# Patient Record
Sex: FEMALE | Race: WHITE | NOT HISPANIC OR LATINO | Employment: FULL TIME | URBAN - METROPOLITAN AREA
[De-identification: names, ages, dates, MRNs, and addresses within clinical notes are randomized per-mention and may not be internally consistent; named-entity substitution may affect disease eponyms.]

---

## 2017-06-08 ENCOUNTER — RECORDS - HEALTHEAST (OUTPATIENT)
Dept: LAB | Facility: CLINIC | Age: 57
End: 2017-06-08

## 2017-06-08 LAB
CHOLEST SERPL-MCNC: 164 MG/DL
FASTING STATUS PATIENT QL REPORTED: ABNORMAL
HDLC SERPL-MCNC: 49 MG/DL
LDLC SERPL CALC-MCNC: 103 MG/DL
TRIGL SERPL-MCNC: 58 MG/DL

## 2018-02-07 ENCOUNTER — RECORDS - HEALTHEAST (OUTPATIENT)
Dept: ADMINISTRATIVE | Facility: OTHER | Age: 58
End: 2018-02-07

## 2019-07-23 ENCOUNTER — RECORDS - HEALTHEAST (OUTPATIENT)
Dept: ADMINISTRATIVE | Facility: OTHER | Age: 59
End: 2019-07-23

## 2020-01-28 ENCOUNTER — RECORDS - HEALTHEAST (OUTPATIENT)
Dept: ADMINISTRATIVE | Facility: OTHER | Age: 60
End: 2020-01-28

## 2020-01-28 ENCOUNTER — COMMUNICATION - HEALTHEAST (OUTPATIENT)
Dept: PULMONOLOGY | Facility: OTHER | Age: 60
End: 2020-01-28

## 2020-02-26 ENCOUNTER — AMBULATORY - HEALTHEAST (OUTPATIENT)
Dept: PULMONOLOGY | Facility: OTHER | Age: 60
End: 2020-02-26

## 2020-02-26 DIAGNOSIS — J44.9 COPD (CHRONIC OBSTRUCTIVE PULMONARY DISEASE) (H): ICD-10-CM

## 2020-03-06 ENCOUNTER — RECORDS - HEALTHEAST (OUTPATIENT)
Dept: PULMONOLOGY | Facility: OTHER | Age: 60
End: 2020-03-06

## 2020-03-06 ENCOUNTER — RECORDS - HEALTHEAST (OUTPATIENT)
Dept: ADMINISTRATIVE | Facility: OTHER | Age: 60
End: 2020-03-06

## 2020-03-06 DIAGNOSIS — J44.9 CHRONIC OBSTRUCTIVE PULMONARY DISEASE, UNSPECIFIED (H): ICD-10-CM

## 2020-03-06 LAB — HGB BLD-MCNC: 14.6 G/DL

## 2020-03-24 ENCOUNTER — AMBULATORY - HEALTHEAST (OUTPATIENT)
Dept: PULMONOLOGY | Facility: OTHER | Age: 60
End: 2020-03-24

## 2020-03-24 RX ORDER — LORATADINE 10 MG/1
10 TABLET ORAL DAILY PRN
Status: SHIPPED | COMMUNITY
Start: 2020-03-24

## 2020-04-01 ENCOUNTER — OFFICE VISIT - HEALTHEAST (OUTPATIENT)
Dept: PULMONOLOGY | Facility: OTHER | Age: 60
End: 2020-04-01

## 2020-04-01 DIAGNOSIS — J41.0 SIMPLE CHRONIC BRONCHITIS (H): ICD-10-CM

## 2020-04-01 ASSESSMENT — MIFFLIN-ST. JEOR: SCORE: 1410.04

## 2020-05-06 ENCOUNTER — COMMUNICATION - HEALTHEAST (OUTPATIENT)
Dept: PULMONOLOGY | Facility: OTHER | Age: 60
End: 2020-05-06

## 2020-08-27 ENCOUNTER — COMMUNICATION - HEALTHEAST (OUTPATIENT)
Dept: FAMILY MEDICINE | Facility: CLINIC | Age: 60
End: 2020-08-27

## 2020-08-27 ENCOUNTER — COMMUNICATION - HEALTHEAST (OUTPATIENT)
Dept: SCHEDULING | Facility: CLINIC | Age: 60
End: 2020-08-27

## 2020-08-28 ENCOUNTER — COMMUNICATION - HEALTHEAST (OUTPATIENT)
Dept: RESPIRATORY THERAPY | Facility: HOSPITAL | Age: 60
End: 2020-08-28

## 2020-08-31 ENCOUNTER — COMMUNICATION - HEALTHEAST (OUTPATIENT)
Dept: RESPIRATORY THERAPY | Facility: CLINIC | Age: 60
End: 2020-08-31

## 2020-09-03 ENCOUNTER — COMMUNICATION - HEALTHEAST (OUTPATIENT)
Dept: RESPIRATORY THERAPY | Facility: CLINIC | Age: 60
End: 2020-09-03

## 2020-09-08 ENCOUNTER — COMMUNICATION - HEALTHEAST (OUTPATIENT)
Dept: RESPIRATORY THERAPY | Facility: CLINIC | Age: 60
End: 2020-09-08

## 2020-09-29 ENCOUNTER — COMMUNICATION - HEALTHEAST (OUTPATIENT)
Dept: RESPIRATORY THERAPY | Facility: HOSPITAL | Age: 60
End: 2020-09-29

## 2020-10-27 ENCOUNTER — COMMUNICATION - HEALTHEAST (OUTPATIENT)
Dept: RESPIRATORY THERAPY | Facility: CLINIC | Age: 60
End: 2020-10-27

## 2020-11-30 ENCOUNTER — COMMUNICATION - HEALTHEAST (OUTPATIENT)
Dept: RESPIRATORY THERAPY | Facility: CLINIC | Age: 60
End: 2020-11-30

## 2021-01-14 ENCOUNTER — COMMUNICATION - HEALTHEAST (OUTPATIENT)
Dept: RESPIRATORY THERAPY | Facility: HOSPITAL | Age: 61
End: 2021-01-14

## 2021-01-27 ENCOUNTER — COMMUNICATION - HEALTHEAST (OUTPATIENT)
Dept: RESPIRATORY THERAPY | Facility: CLINIC | Age: 61
End: 2021-01-27

## 2021-03-04 ENCOUNTER — COMMUNICATION - HEALTHEAST (OUTPATIENT)
Dept: RESPIRATORY THERAPY | Facility: HOSPITAL | Age: 61
End: 2021-03-04

## 2021-03-29 ENCOUNTER — COMMUNICATION - HEALTHEAST (OUTPATIENT)
Dept: RESPIRATORY THERAPY | Facility: HOSPITAL | Age: 61
End: 2021-03-29

## 2021-04-28 ENCOUNTER — COMMUNICATION - HEALTHEAST (OUTPATIENT)
Dept: RESPIRATORY THERAPY | Facility: HOSPITAL | Age: 61
End: 2021-04-28

## 2021-05-24 ENCOUNTER — RECORDS - HEALTHEAST (OUTPATIENT)
Dept: ADMINISTRATIVE | Facility: CLINIC | Age: 61
End: 2021-05-24

## 2021-05-25 ENCOUNTER — RECORDS - HEALTHEAST (OUTPATIENT)
Dept: ADMINISTRATIVE | Facility: CLINIC | Age: 61
End: 2021-05-25

## 2021-05-26 ENCOUNTER — RECORDS - HEALTHEAST (OUTPATIENT)
Dept: ADMINISTRATIVE | Facility: CLINIC | Age: 61
End: 2021-05-26

## 2021-05-28 ENCOUNTER — COMMUNICATION - HEALTHEAST (OUTPATIENT)
Dept: RESPIRATORY THERAPY | Facility: HOSPITAL | Age: 61
End: 2021-05-28

## 2021-05-30 ENCOUNTER — RECORDS - HEALTHEAST (OUTPATIENT)
Dept: ADMINISTRATIVE | Facility: CLINIC | Age: 61
End: 2021-05-30

## 2021-05-31 ENCOUNTER — RECORDS - HEALTHEAST (OUTPATIENT)
Dept: ADMINISTRATIVE | Facility: CLINIC | Age: 61
End: 2021-05-31

## 2021-06-03 ENCOUNTER — RECORDS - HEALTHEAST (OUTPATIENT)
Dept: ADMINISTRATIVE | Facility: CLINIC | Age: 61
End: 2021-06-03

## 2021-06-04 VITALS — BODY MASS INDEX: 34.02 KG/M2 | HEIGHT: 63 IN | WEIGHT: 192 LBS

## 2021-06-07 NOTE — PROGRESS NOTES
"Nargis Mejia is a 59 y.o. female who is being evaluated via a billable telephone visit.      The patient has been notified of following:     \"This telephone visit will be conducted via a call between you and your physician/provider. We have found that certain health care needs can be provided without the need for a physical exam.  This service lets us provide the care you need with a short phone conversation.  If a prescription is necessary we can send it directly to your pharmacy.  If lab work is needed we can place an order for that and you can then stop by our lab to have the test done at a later time.    If during the course of the call the physician/provider feels a telephone visit is not appropriate, you will not be charged for this service.\"     Patient has given verbal consent to a Telephone visit? Yes    Nargis Mejia complains of    Chief Complaint   Patient presents with     COPD     go over pft's       I have reviewed and updated the patient's Past Medical History, Social History, Family History and Medication List.    See above note by Dr. Us    How often do you cough?: 5  How much phlegm (mucous) do you have in your chest?: 4  How tight does your chest feel?: 4  How breathless are you after climbing a hill or flight of stairs?: 5  How limited are your activities at home?: 3  How confident are you leaving home despite your lung condition?: 3  How soundly do you sleep?: 5  How much energy do you have?: 3  Total Score: 32    Josefina Mitchell LPN  "

## 2021-06-07 NOTE — PROGRESS NOTES
"Nargis Mejia is a 59 y.o. female who is being evaluated via a billable telephone visit.      The patient has been notified of following:     \"This telephone visit will be conducted via a call between you and your physician/provider. We have found that certain health care needs can be provided without the need for a physical exam.  This service lets us provide the care you need with a short phone conversation.  If a prescription is necessary we can send it directly to your pharmacy.  If lab work is needed we can place an order for that and you can then stop by our lab to have the test done at a later time.    If during the course of the call the physician/provider feels a telephone visit is not appropriate, you will not be charged for this service.\"     Patient has given verbal consent to a Telephone visit? Yes    Nargis Mejia complains of    Chief Complaint   Patient presents with     COPD     go over pft's and she wants to go over xrays that she had in Jan, she is sob with any little activity, she also coughs a lot, wheezing, this has all been getting worse       I have reviewed and updated the patient's Past Medical History, Social History, Family History and Medication List.    ALLERGIES  Iodine; Nalbuphine; and Sulfa (sulfonamide antibiotics)    Additional provider notes: Ms. Mejia has been struggling with dyspnea about a year now.  She makes mucous every day and her primary doctor suspects she has COPD.  She uses her albuterol 4-5 times a day, sometimes waking up at night to use it.  She is a former smoker, having quit just last summer.  She has not started the turdoza yet, she just got it, spiriva was too expensive.    Assessment/Plan:   1) Based on her PFTs she does have COPD - she has severe airflow obstruction that is reversible, but a preserved DLCO which is a good prognostic sign.  2) She is not adequately controlled on albuterol alone, and I suspect turdoza monotherapy with albuterol will not be " enough either.  Given the amount of mucous I would like to start an ICS, and given her good response to albuterol, I would like to start a LABA.  Will add advair to her regimen with the turdoza to give her triple therapy.  3) Recommend she follow up with us in the future to establish care.    Sergey Us MD PhD  [facility] Pulmonary Critical [facility]        Phone call duration: 25 minutes    Sergey Us MD

## 2021-06-07 NOTE — TELEPHONE ENCOUNTER
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 Said they are using Lysol spay and it really irritates her lungs. Unfortunately there is really nothing. Instructed to remove her self from the room or space while spraying is taking place.

## 2021-06-07 NOTE — PATIENT INSTRUCTIONS - HE
1) You have COPD  2) I think you need another controlling medication in addition to turdoza, we will work with your pharmacy to find one that is affordable

## 2021-06-11 NOTE — TELEPHONE ENCOUNTER
Spoke with Nargis, doing well, no questions for me to day. She did have some issues getting a neb machine upon d/c. She was unable to get one from us due to her insurance requiring it to be a DME not billed as a medication.  She called her insurance once home to find out where she can get a machine.  She was eventually was able to obtain one at [facility].  She has no issues otherwise getting or taking her medications. Reminded when we will call again and to call before if there is a question.  Nkechi Mcguire, LRT, COPD Educator        
[employer] Rounding[employer]Stretcher Alarms

## 2021-06-12 NOTE — TELEPHONE ENCOUNTER
Spoke with Nargis. She is feeling well today and in the green zone. She is able to get and take her medications and is compliant in taking them. Reviewed COPD Action Plan. She had no problems or questions for me today.

## 2021-06-14 NOTE — TELEPHONE ENCOUNTER
Talked with Nargis, she is frustrated as she needed a new primary as her previous one retired, one of the ones her previous PCP recommended was not covered by her insurance even though the clinic is. She states due to that the insurance won't pay for anything in relation to that visit.  She is trying to figure this out with them and needing a new primary.  Otherwise she is fine.  Nkechi Mcguire, LRT, Chronic Pulmonary Disease Specialist

## 2021-06-14 NOTE — TELEPHONE ENCOUNTER
COPD Education follow up call:  Left message with call back number should pt have questions or concerns and COPD Action Plan reminder.

## 2021-06-16 PROBLEM — G89.4 CHRONIC PAIN DISORDER: Status: ACTIVE | Noted: 2020-03-24

## 2021-06-16 PROBLEM — S46.819A STRAIN OF TRAPEZIUS MUSCLE: Status: ACTIVE | Noted: 2020-03-24

## 2021-06-16 PROBLEM — E66.9 OBESITY: Status: ACTIVE | Noted: 2020-03-24

## 2021-06-16 PROBLEM — M54.2 NECK PAIN: Status: ACTIVE | Noted: 2020-03-24

## 2021-06-16 PROBLEM — J44.9 CHRONIC OBSTRUCTIVE PULMONARY DISEASE (H): Status: ACTIVE | Noted: 2020-03-24

## 2021-06-16 PROBLEM — F43.0 ACUTE STRESS REACTION: Status: ACTIVE | Noted: 2020-03-24

## 2021-06-16 PROBLEM — J44.1 COPD WITH ACUTE EXACERBATION (H): Status: ACTIVE | Noted: 2020-08-26

## 2021-06-16 PROBLEM — F17.200 CURRENT SMOKER: Status: ACTIVE | Noted: 2020-03-24

## 2021-06-16 NOTE — TELEPHONE ENCOUNTER
Spoke with Nargis. She states that she has had persistant shortness of breath with exertion for the last few months. She has been using her Advair as prescribed and is still needing to use her rescue inhaler on a regular basis. I suggested that she call her Pulmonologist, Dr Galo. She stated that her insurance has stopped paying for stuff so she no longer makes doctor appointments and thanked me for my call.

## 2021-06-17 NOTE — TELEPHONE ENCOUNTER
Left message for them call back with any questions they may have, our phone number, reminders, and when we plan to call again.    Nkechi Mcguire, LRT, Chronic Pulmonary Disease Specialist

## 2021-06-20 NOTE — LETTER
Letter by Cecilia Garcia MD at      Author: Cecilia Garcia MD Service: -- Author Type: --    Filed:  Encounter Date: 1/28/2020 Status: (Other)         Nargis Mejia  [address] [address]    January 28, 2020    Dear Ms. Josepepe,    Welcome to [facility]! Your appointment information is below.   Please bring the following to your appointment:    Insurance Card, so we may scan it for our records    Drivers license or valid ID, so we may scan it for our records    Co-pay (as applicable per your insurance plan)    A current list of your medications including over the counter products such as vitamins and supplements    Your medical records including copies of X-Ray films if you are transferring your care from another clinic.  If you do not have your records, please fill out the release of information form and we will request those records.     Provider: Cecilia Garcia MD  Appointment Date:   Friday, March 6, 2020  Arrival Time:  3:00 PFT,  4:00 dr appt.    Location: [facility]         [address]        [place], [address]    **Please allow adequate time for your commute and parking. If you are more than 10 minutes late, you may be asked to reschedule.     If you need to cancel or reschedule your appointment, please notify us at least 24 hours prior to your appointment time so we are able to make this time available for another patient.    Thank you for choosing the [facility] for your health care needs. If you have any questions, please do not hesitate to contact us at any time at   [phone]. We look forward to caring for you.     Sincerely,     [facility] staff

## 2021-06-20 NOTE — LETTER
Letter by Natalio SZYMANSKI MD at      Author: Natalio SZYMANSKI MD Service: -- Author Type: --    Filed:  Encounter Date: 8/27/2020 Status: (Other)         August 27, 2020     Patient: Nargis Mejia   YOB: 1960   Date of Visit: 8/27/2020       To Whom It May Concern:    It is my medical opinion that Nargis Mejia may return to work on 8/31/2020.    If you have any questions or concerns, please don't hesitate to call.    Sincerely,        Electronically signed by Natalio SZYMANSKI MD

## 2021-06-25 NOTE — TELEPHONE ENCOUNTER
COPD Education follow up call:  Left message with call back number should pt have questions or concerns.

## 2021-06-29 ENCOUNTER — COMMUNICATION - HEALTHEAST (OUTPATIENT)
Dept: RESPIRATORY THERAPY | Facility: HOSPITAL | Age: 61
End: 2021-06-29

## 2021-07-04 NOTE — TELEPHONE ENCOUNTER
Telephone Encounter by Nkechi Mcguire LRT at 6/29/2021  3:40 PM     Author: Nkechi Mcguire LRT Service: -- Author Type: Respiratory Therapist    Filed: 6/29/2021  3:41 PM Encounter Date: 6/29/2021 Status: Signed    : Nkechi Mcguire LRT (Respiratory Therapist)       Left message for Nargis to call if she has questions  AMBER Braswell, Chronic Pulmonary Disease Specialist

## 2021-07-27 ENCOUNTER — TELEPHONE (OUTPATIENT)
Dept: RESPIRATORY THERAPY | Facility: HOSPITAL | Age: 61
End: 2021-07-27

## 2021-07-27 NOTE — TELEPHONE ENCOUNTER
Spoke with Nargis. She is having some mild difficulty breathing recently and has been using a few more albuterol nebulizer treatments. She feels that this may be due to the heat and humidity. She has been trying to stay inside in the airconditioning. I advised her to seek care if she worsens and/or she is not feeling relief from taking a nebulizer. Gave her the number for her Pulmonary Clinic.She is struggling to find a PCP who's care is covered by her insurance,   but she is able to get and her medications and is compliant in taking them.   Alejo Mcclendon, RT, TTS, Chronic Pulmonary Disease Specialist

## 2021-08-26 ENCOUNTER — TELEPHONE (OUTPATIENT)
Dept: RESPIRATORY THERAPY | Facility: HOSPITAL | Age: 61
End: 2021-08-26

## 2021-08-26 NOTE — TELEPHONE ENCOUNTER
COPD Education follow up call:  Left message with call back number should pt have questions or concerns and COPD Action Plan reminder.  Alejo Mcclendon, RT, TTS, Chronic Pulmonary Disease Specialist

## 2023-05-26 ENCOUNTER — HOSPITAL ENCOUNTER (EMERGENCY)
Facility: CLINIC | Age: 63
Discharge: HOME | End: 2023-05-26
Attending: EMERGENCY MEDICINE | Admitting: EMERGENCY MEDICINE
Payer: COMMERCIAL

## 2023-05-26 VITALS
HEIGHT: 63 IN | RESPIRATION RATE: 16 BRPM | SYSTOLIC BLOOD PRESSURE: 120 MMHG | DIASTOLIC BLOOD PRESSURE: 69 MMHG | BODY MASS INDEX: 33.66 KG/M2 | OXYGEN SATURATION: 97 % | TEMPERATURE: 97.2 F | WEIGHT: 190 LBS | HEART RATE: 58 BPM

## 2023-05-26 DIAGNOSIS — M54.9 MID BACK PAIN ON LEFT SIDE: ICD-10-CM

## 2023-05-26 PROCEDURE — 250N000013 HC RX MED GY IP 250 OP 250 PS 637: Performed by: EMERGENCY MEDICINE

## 2023-05-26 PROCEDURE — 99284 EMERGENCY DEPT VISIT MOD MDM: CPT | Mod: 25

## 2023-05-26 PROCEDURE — 96372 THER/PROPH/DIAG INJ SC/IM: CPT | Performed by: EMERGENCY MEDICINE

## 2023-05-26 PROCEDURE — 250N000011 HC RX IP 250 OP 636: Performed by: EMERGENCY MEDICINE

## 2023-05-26 RX ORDER — LIDOCAINE 4 G/G
1 PATCH TOPICAL ONCE
Status: DISCONTINUED | OUTPATIENT
Start: 2023-05-26 | End: 2023-05-26 | Stop reason: HOSPADM

## 2023-05-26 RX ORDER — KETOROLAC TROMETHAMINE 30 MG/ML
30 INJECTION, SOLUTION INTRAMUSCULAR; INTRAVENOUS ONCE
Status: COMPLETED | OUTPATIENT
Start: 2023-05-26 | End: 2023-05-26

## 2023-05-26 RX ORDER — ACETAMINOPHEN 325 MG/1
975 TABLET ORAL ONCE
Status: COMPLETED | OUTPATIENT
Start: 2023-05-26 | End: 2023-05-26

## 2023-05-26 RX ADMIN — ACETAMINOPHEN 975 MG: 325 TABLET ORAL at 10:50

## 2023-05-26 RX ADMIN — LIDOCAINE 1 PATCH: 246 PATCH TOPICAL at 10:55

## 2023-05-26 RX ADMIN — KETOROLAC TROMETHAMINE 30 MG: 30 INJECTION, SOLUTION INTRAMUSCULAR; INTRAVENOUS at 10:51

## 2023-05-26 ASSESSMENT — ENCOUNTER SYMPTOMS
NECK PAIN: 0
BACK PAIN: 1

## 2023-05-26 ASSESSMENT — ACTIVITIES OF DAILY LIVING (ADL): ADLS_ACUITY_SCORE: 35

## 2023-05-26 NOTE — ED TRIAGE NOTES
Arrives to ED with c/o L lower back pain that radiates to mid back. Began yesterday. Worse today at 0500. Tried muscle relaxer and ibuprofen without relief. Went to chiropractor who recommended pt come to ED. Denies injury.      Triage Assessment     Row Name 05/26/23 0955       Triage Assessment (Adult)    Airway WDL WDL       Respiratory WDL    Respiratory WDL WDL       Skin Circulation/Temperature WDL    Skin Circulation/Temperature WDL WDL       Cardiac WDL    Cardiac WDL WDL       Peripheral/Neurovascular WDL    Peripheral Neurovascular WDL WDL       Cognitive/Neuro/Behavioral WDL    Cognitive/Neuro/Behavioral WDL WDL

## 2023-05-26 NOTE — DISCHARGE INSTRUCTIONS
As needed for pain control at home, take:  - over-the-counter ibuprofen 800mg by mouth every 8 hours (max dose 2400mg in 24 hours)  - over-the-counter acetaminophen 1g by mouth every 6 hours (max dose 4g in 24 hours)  - prescribed tizanidine for muscle spasm  - over-the-counter lidocaine patches to painful area (up to 12 hours on, then 12 hours off)  - over-the-counter Tiger Balm patches (1-2 patches per day)  - ice pack to painful area up to 20 minutes every 1 hour   - back exercises 30 minutes daily    The worst thing you can do is lie still in bed and not move. This will result in worsened tightening of the low back muscles and worse pain. You need to move as much as possible to help keep the muscles loose.    Follow up with your Primary Care provider in 2 days for a recheck.    Return to the Emergency Department for any inability to urinate, inability to move your legs, new or worsening symptoms, or any other concerns.

## 2023-05-26 NOTE — ED PROVIDER NOTES
"EMERGENCY DEPARTMENT ENCOUNTER      NAME: Nargis Mejia  AGE: 62 year old female  YOB: 1960  MRN: [id number]  EVALUATION DATE & TIME: 5/26/2023  9:57 AM    PCP: Prakash Raymond    ED PROVIDER: Patrick Chamberlain M.D.      Chief Complaint   Patient presents with     Back Pain         IMPRESSION  1. Mid back pain on left side        PLAN  - routine home back pain cares (NSAIDs, Lidoderm patches, Tiger Balm patches, ice, back exercises, avoid bedrest)  - close PCP follow up  - discharge to home    ED COURSE & MEDICAL DECISION MAKING    ED Course as of 05/26/23 1115   Fri May 26, 2023   1032 62yoF with history of COPD, obesity, chronic pain presenting with her  for evaluation of left mid back pain. Reports she was walking around at home yesterday evening when she developed \"spasming\" nonradiating pain to her left mid back; worse with movement. Resolved spontaneously after a couple minutes and she was able to garden later than evening. This morning, woke up feeling well and walked to the bathroom. When walking out of the bathroom, left mid back pain returned and has been ongoing since. Took previously-prescribed tizanidine with only mild relief. Went to her chiropractor who directed her to the ED. Denies any falls or trauma. States lying still is the only thing that makes her back pain better. Denies any chest pain, shortness of breath, pleuritic or exertional symptoms. No UTI symptoms, fevers, sweats, chills. No abdominal pain, nausea, vomiting, diarrhea.    SBP 170s on presentation. Lying still comfortably in bed on exam with clear lungs, normal work of breathing, benign abdomen, no CVA tenderness but does have left paraspinal mid back muscle tenderness/spasm with notable increased pain with any sort of twisting or bending movements, no overlying skin changes, no midline spinal tenderness, normal neuro exam to extremities.    No concern for conus medullaris syndrome, cauda equina syndrome, " spinal cord/column etiology. Doubt fracture; patient comfortable not obtaining X-rays at this time which I agree with. No UTI symptoms and location not consistent with pyelo; doubt benefit to UA and patient comfortable not obtaining this here now. No concern for referred intraabdominal etiology. Has clear muscular pain driving presentation today. Given Toradol, Tylenol, Lidoderm, ice pack with improvement. Patient able to walk on her own. Patient &  comfortable with discharge home at this time. Return precautions and need for PCP follow up discussed and understood. No further questions at the time of discharge.       --------------------------------------------------------------------------------   --------------------------------------------------------------------------------         This patient involved a high degree of complexity in medical decision making, as significant risks were present and assessed. Recent encounters & results in medical record reviewed by me.    All workup (i.e. any EKG/labs/imaging as per charting below) reviewed and independently interpreted by me. See respective sections for details.    Broad differential considered for this patient presenting, including but not limited to:  Muscle spasm, rib fracture, spinal fracture, spinal epidural hematoma, spinal epidural abscess, cauda equina syndrome, conus medullaris syndrome, radiculopathy, cellulitis, zoster, necrotizing fasciitis, sepsis, pyelo, ureteral stone, other referred intraabdominal etiology      See additional MDM below if interested.    MEDICATIONS GIVEN IN THE EMERGENCY DEPARTMENT  Medications   Lidocaine (LIDOCARE) 4 % Patch 1 patch (1 patch Transdermal $Patch/Med Applied 5/26/23 1055)   ketorolac (TORADOL) injection 30 mg (30 mg Intramuscular $Given 5/26/23 1051)   acetaminophen (TYLENOL) tablet 975 mg (975 mg Oral $Given 5/26/23 1050)       NEW PRESCRIPTIONS STARTED AT TODAY'S ER VISIT  Current Discharge Medication List       START taking these medications    Details   tiZANidine (ZANAFLEX) 4 MG tablet Take 1 tablet (4 mg) by mouth 3 times daily as needed for muscle spasms  Qty: 20 tablet, Refills: 0         CONTINUE these medications which have NOT CHANGED    Details   albuterol (PROAIR HFA) 90 mcg/actuation inhaler [ALBUTEROL (PROAIR HFA) 90 MCG/ACTUATION INHALER] Inhale 2 puffs every 4 (four) hours as needed for wheezing or shortness of breath.  Refills: 0    Comments: May substitute the equivalent medication per insurance preference.  Associated Diagnoses: COPD with acute exacerbation (H)      albuterol 2.5 mg/0.5 mL Nebu nebulizer solution [ALBUTEROL 2.5 MG/0.5 ML NEBU NEBULIZER SOLUTION] Take 0.5 mL (2.5 mg total) by nebulization every 4 (four) hours as needed for wheezing (shortness of btreath).  Qty: 30 each, Refills: 1    Associated Diagnoses: COPD with acute exacerbation (H)      aspirin 81 MG EC tablet [ASPIRIN 81 MG EC TABLET] Take 81 mg by mouth daily.      azithromycin (ZITHROMAX) 250 MG tablet [AZITHROMYCIN (ZITHROMAX) 250 MG TABLET] Take 2 tablets (500 mg total) by mouth daily. To complete 3 days course  Qty: 1 tablet, Refills: 0    Associated Diagnoses: COPD with acute exacerbation (H)      calcium, as carbonate, (TUMS) 200 mg calcium (500 mg) chewable tablet [CALCIUM, AS CARBONATE, (TUMS) 200 MG CALCIUM (500 MG) CHEWABLE TABLET] Chew 1 tablet 3 (three) times a day as needed for heartburn.      guaiFENesin ER (MUCINEX) 600 mg 12 hr tablet [GUAIFENESIN ER (MUCINEX) 600 MG 12 HR TABLET] Take 1 tablet (600 mg total) by mouth 2 (two) times a day.  Refills: 0    Associated Diagnoses: COPD with acute exacerbation (H)      ibuprofen (ADVIL,MOTRIN) 200 MG tablet [IBUPROFEN (ADVIL,MOTRIN) 200 MG TABLET] Take 800 mg by mouth every 8 (eight) hours as needed for pain.      loratadine (ALLERGY RELIEF, LORATADINE,) 10 mg tablet [LORATADINE (ALLERGY RELIEF, LORATADINE,) 10 MG TABLET] Take 10 mg by mouth daily as needed.      "  MEDICATION CANNOT BE REORDERED - PLEASE MANUALLY REORDER AND DISCONTINUE THE OLD ORDER [ACLIDINIUM BROMIDE (TUDORZA PRESSAIR) 400 MCG/ACTUATION AEPB INHALER] Inhale 1 puff 2 (two) times a day.       nebulizer and compressor Mona [NEBULIZER AND COMPRESSOR MONA] For albuterol neulizer  Qty: 1 each, Refills: 0    Associated Diagnoses: COPD with acute exacerbation (H)                 =================================================================      HPI  Nargis Mejia is a 62 year old female with history of COPD, obesity, chronic pain presenting with her  for evaluation of left mid back pain. Reports she was walking around at home yesterday evening when she developed \"spasming\" nonradiating pain to her left mid back; worse with movement. Resolved spontaneously after a couple minutes and she was able to garden later than evening. This morning, woke up feeling well and walked to the bathroom. When walking out of the bathroom, left mid back pain returned and has been ongoing since. Took previously-prescribed tizanidine with only mild relief. Went to her chiropractor who directed her to the ED. Denies any falls or trauma. States lying still is the only thing that makes her back pain better. Denies any chest pain, shortness of breath, pleuritic or exertional symptoms. No UTI symptoms, fevers, sweats, chills. No abdominal pain, nausea, vomiting, diarrhea.      REVIEW OF SYSTEMS   Review of Systems   Musculoskeletal: Positive for back pain. Negative for neck pain.   Neurological:        Negative for tingling. Negative for focal weakness.   All other systems reviewed and are negative.    All other systems reviewed and are negative except as noted above in HPI.          --------------- MEDICAL HISTORY ---------------  PAST MEDICAL HISTORY:  Reviewed by me.  Past Medical History:   Diagnosis Date     Asthma      COPD (chronic obstructive pulmonary disease) (H)      Patient Active Problem List   Diagnosis     Acute " stress reaction     Chronic obstructive pulmonary disease (H)     Chronic pain disorder     Neck pain     Obesity     Strain of trapezius muscle     Current smoker     COPD with acute exacerbation (H)     Acute renal failure, unspecified acute renal failure type (H)       PAST SURGICAL HISTORY:  Reviewed by me.  Past Surgical History:   Procedure Laterality Date     ABDOMEN SURGERY      gallbladder     HYSTERECTOMY         CURRENT MEDICATIONS:    Reviewed by me.    Current Facility-Administered Medications:      Lidocaine (LIDOCARE) 4 % Patch 1 patch, 1 patch, Transdermal, Once, Patrick Chamberlain MD, 1 patch at 05/26/23 1055    Current Outpatient Medications:      tiZANidine (ZANAFLEX) 4 MG tablet, Take 1 tablet (4 mg) by mouth 3 times daily as needed for muscle spasms, Disp: 20 tablet, Rfl: 0     albuterol (PROAIR HFA) 90 mcg/actuation inhaler, [ALBUTEROL (PROAIR HFA) 90 MCG/ACTUATION INHALER] Inhale 2 puffs every 4 (four) hours as needed for wheezing or shortness of breath., Disp: , Rfl: 0     albuterol 2.5 mg/0.5 mL Nebu nebulizer solution, [ALBUTEROL 2.5 MG/0.5 ML NEBU NEBULIZER SOLUTION] Take 0.5 mL (2.5 mg total) by nebulization every 4 (four) hours as needed for wheezing (shortness of btreath)., Disp: 30 each, Rfl: 1     aspirin 81 MG EC tablet, [ASPIRIN 81 MG EC TABLET] Take 81 mg by mouth daily., Disp: , Rfl:      azithromycin (ZITHROMAX) 250 MG tablet, [AZITHROMYCIN (ZITHROMAX) 250 MG TABLET] Take 2 tablets (500 mg total) by mouth daily. To complete 3 days course, Disp: 1 tablet, Rfl: 0     calcium, as carbonate, (TUMS) 200 mg calcium (500 mg) chewable tablet, [CALCIUM, AS CARBONATE, (TUMS) 200 MG CALCIUM (500 MG) CHEWABLE TABLET] Chew 1 tablet 3 (three) times a day as needed for heartburn., Disp: , Rfl:      guaiFENesin ER (MUCINEX) 600 mg 12 hr tablet, [GUAIFENESIN ER (MUCINEX) 600 MG 12 HR TABLET] Take 1 tablet (600 mg total) by mouth 2 (two) times a day., Disp: , Rfl: 0     ibuprofen (ADVIL,MOTRIN)  "200 MG tablet, [IBUPROFEN (ADVIL,MOTRIN) 200 MG TABLET] Take 800 mg by mouth every 8 (eight) hours as needed for pain., Disp: , Rfl:      loratadine (ALLERGY RELIEF, LORATADINE,) 10 mg tablet, [LORATADINE (ALLERGY RELIEF, LORATADINE,) 10 MG TABLET] Take 10 mg by mouth daily as needed. , Disp: , Rfl:      MEDICATION CANNOT BE REORDERED - PLEASE MANUALLY REORDER AND DISCONTINUE THE OLD ORDER, [ACLIDINIUM BROMIDE (TUDORZA PRESSAIR) 400 MCG/ACTUATION AEPB INHALER] Inhale 1 puff 2 (two) times a day. , Disp: , Rfl:      nebulizer and compressor Mona, [NEBULIZER AND COMPRESSOR MONA] For albuterol neulizer, Disp: 1 each, Rfl: 0    ALLERGIES:  Reviewed by me.  Allergies   Allergen Reactions     Iodine Other (See Comments)     sneezing     Nalbuphine Unknown     Other reaction(s): loses control     Sulfa (Sulfonamide Antibiotics) [Sulfa Antibiotics] Other (See Comments)     Pt states it's a sensitivity       FAMILY HISTORY:  Reviewed by me.  Reviewed. No pertinent past family history.    SOCIAL HISTORY:   Reviewed by me.  Social History     Socioeconomic History     Marital status:    Tobacco Use     Smoking status: Former     Packs/day: 0.25     Types: Cigarettes     Quit date: 2020     Years since quittin.7     Smokeless tobacco: Never     Tobacco comments:     smoked 30 years   Substance and Sexual Activity     Drug use: Never         --------------- PHYSICAL EXAM ---------------  Nursing notes and vitals independently reviewed by me.  VITALS:  Vitals:    23 0953   BP: (!) 172/84   Pulse: 75   Resp: 16   Temp: 97.2  F (36.2  C)   TempSrc: Temporal   SpO2: 97%   Weight: 86.2 kg (190 lb)   Height: 1.6 m (5' 3\")       PHYSICAL EXAM:    General:  alert, interactive, no distress, lying still comfortably in bed during exam  Eyes:  conjunctivae clear, conjugate gaze  HENT:  atraumatic, nose with no rhinorrhea, oropharynx clear  Neck:  no meningismus  Cardiovascular:  HR 70s during exam, regular rhythm, no " murmurs, brisk cap refill  Chest:  no chest wall tenderness  Pulmonary:  no stridor, normal phonation, normal work of breathing, clear lungs bilaterally  Abdomen:  soft, nondistended, nontender  :  no CVA tenderness  Back:  no midline spinal tenderness, left paraspinal mid back muscle tenderness/spasm with notable increased pain with any sort of twisting or bending movements, no overlying skin changes  Musculoskeletal:  no pretibial edema, no calf tenderness. Gross ROM intact to joints of extremities with no obvious deformities.  Skin:  warm, dry, no rash  Neuro:  awake, alert, answers questions appropriately, follows commands, moves all limbs, 5/5 strength to all extremities with sensation to light touch intact, no tremor, no ankle clonus  Psych:  calm, normal affect              --------------- ADDITIONAL MDM ---------------  History:  - Supplemental history from:       -- see above charting, if applicable: patient, family ()  - External Record(s) reviewed:       -- see above charting, if applicable       -- Inpatient/outpatient record (admission 8/25/20), prior labs (blood tests 8/25/20), prior imaging (CXR 8/25/20)    Workup:  - Chart documentation above includes differential considered and any EKGs or imaging independently interpreted by provider.  - In additional to work up documented, I considered the following work up:       -- see above charting, if applicable    External Consultation:  - Discussion of management with another provider:       -- see above charting, if applicable    Complicating Factors:  - Care impacted by chronic illness:       -- see above MDM, past medical history, & problem list  - Care affected by social determinants of health:       -- see above social history    Disposition Considerations:  - Discharge       -- I considered admission given that the patient came to the Emergency Department, but ultimately discharged the patient per decision making above       -- I recommended  the patient continue their current prescription strength medication(s) as charted above in current medications list       -- I prescribed new medication as charted above       -- I recommended over-the-counter medication(s) as charted above & in discharge instructions         Patrick Chamberlain MD  05/26/23  Emergency Medicine  [facility] EMERGENCY ROOM  [address] [id number]  [phone]  Dept: [phone]     Patrick Chamberlain MD  05/26/23 1041       Patrick Chamberlain MD  05/26/23 1114

## 2023-09-19 ENCOUNTER — LAB REQUISITION (OUTPATIENT)
Dept: LAB | Facility: CLINIC | Age: 63
End: 2023-09-19
Payer: COMMERCIAL

## 2023-09-19 DIAGNOSIS — Z13.220 ENCOUNTER FOR SCREENING FOR LIPOID DISORDERS: ICD-10-CM

## 2023-09-19 DIAGNOSIS — Z13.228 ENCOUNTER FOR SCREENING FOR OTHER METABOLIC DISORDERS: ICD-10-CM

## 2023-09-19 LAB
ANION GAP SERPL CALCULATED.3IONS-SCNC: 10 MMOL/L (ref 7–15)
BUN SERPL-MCNC: 19.2 MG/DL (ref 8–23)
CALCIUM SERPL-MCNC: 8.8 MG/DL (ref 8.8–10.2)
CHLORIDE SERPL-SCNC: 105 MMOL/L (ref 98–107)
CHOLEST SERPL-MCNC: 177 MG/DL
CREAT SERPL-MCNC: 0.93 MG/DL (ref 0.51–0.95)
DEPRECATED HCO3 PLAS-SCNC: 25 MMOL/L (ref 22–29)
EGFRCR SERPLBLD CKD-EPI 2021: 69 ML/MIN/1.73M2
GLUCOSE SERPL-MCNC: 89 MG/DL (ref 70–99)
HDLC SERPL-MCNC: 57 MG/DL
LDLC SERPL CALC-MCNC: 104 MG/DL
NONHDLC SERPL-MCNC: 120 MG/DL
POTASSIUM SERPL-SCNC: 4.2 MMOL/L (ref 3.4–5.3)
SODIUM SERPL-SCNC: 140 MMOL/L (ref 136–145)
TRIGL SERPL-MCNC: 80 MG/DL

## 2023-09-19 PROCEDURE — 80048 BASIC METABOLIC PNL TOTAL CA: CPT | Mod: ORL | Performed by: PHYSICIAN ASSISTANT

## 2023-09-19 PROCEDURE — 80061 LIPID PANEL: CPT | Mod: ORL | Performed by: PHYSICIAN ASSISTANT

## 2025-03-20 NOTE — TELEPHONE ENCOUNTER
Left message for them call back with any questions they may have, our phone number, reminders, and when we plan to call again.    Nkechi Mcguire, LRT, COPD Educator       Message Type:  Refill Medication     Name Of Med/Meds:    1.esomeprazole (NexIUM) 40 MG capsule   2.  3.    Is the patient OUT of Medication? Yes    Name of Pharmacy:[facility]     Phone Number to Pharmacy:[phone]    Preferred Delivery Method for Clinical team to reach caller or patient: call back    Alternative phone number:     Can a detailed message be left? Yes    Caller or patient has been advised that Medication refill will be addressed within 2-3 business days and once approve it will be send to the pharmacy

## 2025-07-29 ENCOUNTER — LAB REQUISITION (OUTPATIENT)
Dept: LAB | Facility: CLINIC | Age: 65
End: 2025-07-29
Payer: COMMERCIAL

## 2025-07-29 DIAGNOSIS — Z13.220 ENCOUNTER FOR SCREENING FOR LIPOID DISORDERS: ICD-10-CM

## 2025-07-29 DIAGNOSIS — Z13.1 ENCOUNTER FOR SCREENING FOR DIABETES MELLITUS: ICD-10-CM

## 2025-07-30 LAB
ANION GAP SERPL CALCULATED.3IONS-SCNC: 10 MMOL/L (ref 7–15)
BUN SERPL-MCNC: 23.1 MG/DL (ref 8–23)
CALCIUM SERPL-MCNC: 9.2 MG/DL (ref 8.8–10.4)
CHLORIDE SERPL-SCNC: 104 MMOL/L (ref 98–107)
CHOLEST SERPL-MCNC: 180 MG/DL
CREAT SERPL-MCNC: 0.97 MG/DL (ref 0.51–0.95)
EGFRCR SERPLBLD CKD-EPI 2021: 65 ML/MIN/1.73M2
FASTING STATUS PATIENT QL REPORTED: ABNORMAL
FASTING STATUS PATIENT QL REPORTED: ABNORMAL
GLUCOSE SERPL-MCNC: 100 MG/DL (ref 70–99)
HCO3 SERPL-SCNC: 26 MMOL/L (ref 22–29)
HDLC SERPL-MCNC: 59 MG/DL
LDLC SERPL CALC-MCNC: 106 MG/DL
NONHDLC SERPL-MCNC: 121 MG/DL
POTASSIUM SERPL-SCNC: 4 MMOL/L (ref 3.4–5.3)
SODIUM SERPL-SCNC: 140 MMOL/L (ref 135–145)
TRIGL SERPL-MCNC: 74 MG/DL